# Patient Record
Sex: FEMALE | Race: WHITE | URBAN - METROPOLITAN AREA
[De-identification: names, ages, dates, MRNs, and addresses within clinical notes are randomized per-mention and may not be internally consistent; named-entity substitution may affect disease eponyms.]

---

## 2023-04-28 ENCOUNTER — OFFICE VISIT (OUTPATIENT)
Dept: OTOLARYNGOLOGY | Facility: CLINIC | Age: 88
End: 2023-04-28

## 2023-04-28 ENCOUNTER — OFFICE VISIT (OUTPATIENT)
Dept: AUDIOLOGY | Facility: CLINIC | Age: 88
End: 2023-04-28

## 2023-04-28 VITALS — HEIGHT: 59 IN | WEIGHT: 113 LBS | TEMPERATURE: 98 F | BODY MASS INDEX: 22.78 KG/M2

## 2023-04-28 DIAGNOSIS — H90.3 SENSORINEURAL HEARING LOSS (SNHL) OF BOTH EARS: Primary | ICD-10-CM

## 2023-04-28 DIAGNOSIS — H90.3 SENSORINEURAL HEARING LOSS, BILATERAL: Primary | ICD-10-CM

## 2023-04-28 PROCEDURE — 92567 TYMPANOMETRY: CPT | Performed by: AUDIOLOGIST

## 2023-04-28 PROCEDURE — 99203 OFFICE O/P NEW LOW 30 MIN: CPT | Performed by: OTOLARYNGOLOGY

## 2023-04-28 PROCEDURE — 92557 COMPREHENSIVE HEARING TEST: CPT | Performed by: AUDIOLOGIST

## 2024-10-14 ENCOUNTER — TELEPHONE (OUTPATIENT)
Dept: OTOLARYNGOLOGY | Facility: CLINIC | Age: 89
End: 2024-10-14

## 2024-10-14 DIAGNOSIS — H90.3 SENSORINEURAL HEARING LOSS, BILATERAL: Primary | ICD-10-CM

## 2024-10-14 NOTE — TELEPHONE ENCOUNTER
pt seen more than 1 year ago, at that discussed amplification. Ok to give medical order for hearing aid or should pt be seen ? Please advise

## 2024-10-14 NOTE — TELEPHONE ENCOUNTER
Per patient is looking to get hearing aids at [facility], states she needs referral from Dr. Phan. States this can be faxed to [phone]. Please advise thank you.

## 2024-10-15 NOTE — TELEPHONE ENCOUNTER
Referral for hearing aids faxed to [facility] per patient request. Called and informed patient. Call complete.